# Patient Record
Sex: FEMALE | Race: WHITE
[De-identification: names, ages, dates, MRNs, and addresses within clinical notes are randomized per-mention and may not be internally consistent; named-entity substitution may affect disease eponyms.]

---

## 2017-02-23 ENCOUNTER — HOSPITAL ENCOUNTER (OUTPATIENT)
Dept: HOSPITAL 45 - C.MAMM | Age: 67
Discharge: HOME | End: 2017-02-23
Attending: OBSTETRICS & GYNECOLOGY
Payer: COMMERCIAL

## 2017-02-23 DIAGNOSIS — Z01.419: Primary | ICD-10-CM

## 2017-02-23 DIAGNOSIS — Z12.31: ICD-10-CM

## 2017-02-23 DIAGNOSIS — Z13.820: ICD-10-CM

## 2017-02-24 NOTE — MAMMOGRAPHY REPORT
BILATERAL DIGITAL SCREENING MAMMOGRAM WITH CAD: 2/23/2017

CLINICAL HISTORY: Routine screening.  Patient has no complaints.  





TECHNIQUE:  Current study was also evaluated with a Computer Aided Detection (CAD) system.  Bilatera
l CC and MLO views were obtained.  Note that the patient returned the next day for a repeat right cc
 view due to motion artifact.



COMPARISON: Comparison is made to exams dated:  9/2/2015 mammogram, 9/4/2014 mammogram, 8/15/2014 ma
mmogram, 9/18/2014 mammogram, 6/28/2013 mammogram, and 8/17/2011 mammogram - [facility]
enter.   



BREAST COMPOSITION:  The tissue of both breasts is heterogeneously dense, which may obscure small ma
sses.  



FINDINGS:  No suspicious masses, calcifications, or areas of architectural distortion are noted in e
ither breast. There has been no significant interval change compared to prior exams.  A biopsy marke
r clip is again noted in the right 6:00 breast.  Bilateral benign-appearing calcifications are not s
ignificantly changed.  Linear metallic density measuring 10 mm in the right 12:00 breast is stable d
ating back to at least the 2007 exam.





IMPRESSION:  ACR BI-RADS CATEGORY 2: BENIGN

There is no mammographic evidence of malignancy. A 1 year screening mammogram is recommended.  The p
atient will receive written notification of the results.  





Approximately 10% of breast cancers are not detected with mammography. A negative mammographic repor
t should not delay biopsy if a clinically suggestive mass is present.



Mercy Hagan M.D.          

ah/:2/24/2017 14:48:25  



Imaging Technologist: Amada BEAUCHAMP,R, M, [facility]

letter sent: Normal 1/2  

BI-RADS Code: ACR BI-RADS Category 2: Benign

## 2017-03-08 NOTE — CODING QUERY MEDICAL NECESSITY
SUPPORTING DIAGNOSIS NEEDED





A supporting diagnosis is required for the test/procedure performed on this patient in 
order for us to be reimbursed by the patient's insurance. Please provide a supporting 
diagnosis for the following test/procedure listed below next to the test name along with 
your signature. 



*If there is no additional diagnosis for this patient that would support the following 
test/procedure please document that below next to the test/procedure.



Test(s)/Procedure(s) that require a supporting diagnosis:

  *  DXA BONE DENSITY      DIAGNOSIS:

  *  DOS: 2/23/17



Provider Signature:  ______________________________  Date:  _______



Thank you  

Lindsey Benítez

Health Information Management

Phone:  [phone]

Fax:  [phone]



Once completed, please kindly fax back to [phone]



For questions please call [phone]

## 2018-05-01 ENCOUNTER — HOSPITAL ENCOUNTER (OUTPATIENT)
Dept: HOSPITAL 45 - C.MAMM | Age: 68
Discharge: HOME | End: 2018-05-01
Attending: OBSTETRICS & GYNECOLOGY
Payer: COMMERCIAL

## 2018-05-01 DIAGNOSIS — Z12.31: Primary | ICD-10-CM

## 2018-05-02 NOTE — MAMMOGRAPHY REPORT
BILATERAL DIGITAL SCREENING MAMMOGRAM TOMOSYNTHESIS WITH CAD: 5/1/2018

CLINICAL HISTORY: Routine screening.  Patient has no complaints.  



TECHNIQUE:  Breast tomosynthesis in addition to standard 2D mammography was performed. Current study 
was also evaluated with a Computer Aided Detection (CAD) system.  



COMPARISON: Comparison is made to exams dated:  9/2/2015 mammogram, 2/23/2017 mammogram, 9/4/2014 [facility]
mogram, 9/4/2014 ultrasound, 8/15/2014 mammogram, and 6/28/2013 mammogram - [facility]
ter.   



BREAST COMPOSITION:  The tissue of both breasts is heterogeneously dense, which may obscure small mas
ses.  



FINDINGS:There are stable postsurgical changes in the 12:00 right breast, with benign-appearing calci
fications and a 10 mm linear metallic density at the surgical site.  There is a stable biopsy marker 
clip in the 6:00 right breast. Numerous bilateral groupings of benign-appearing microcalcifications a
nd benign rim calcifications in the breasts.  No new suspicious mass, architectural distortion or clu
ster of microcalcifications is seen.  



IMPRESSION:  ACR BI-RADS CATEGORY 1: NEGATIVE

There is no mammographic evidence of malignancy. A 1 year screening mammogram is recommended.  The pa
tient will receive written notification of the results.  





Approximately 10% of breast cancers are not detected with mammography. A negative mammographic report
 should not delay biopsy if a clinically suggestive mass is present.



Eveline Alicea M.D.          

ay/:5/1/2018 15:49:25  



Imaging Technologist: Maribell BEAUCHAMP(JOSIE)(M), [facility]

letter sent: Normal 1/2  

BI-RADS Code: ACR BI-RADS Category 1: Negative